# Patient Record
Sex: MALE | Race: WHITE | NOT HISPANIC OR LATINO | Employment: UNEMPLOYED | URBAN - METROPOLITAN AREA
[De-identification: names, ages, dates, MRNs, and addresses within clinical notes are randomized per-mention and may not be internally consistent; named-entity substitution may affect disease eponyms.]

---

## 2021-01-19 ENCOUNTER — HISTORICAL (OUTPATIENT)
Dept: ADMINISTRATIVE | Facility: HOSPITAL | Age: 21
End: 2021-01-19

## 2022-04-10 ENCOUNTER — HISTORICAL (OUTPATIENT)
Dept: ADMINISTRATIVE | Facility: HOSPITAL | Age: 22
End: 2022-04-10
Payer: MEDICAID

## 2022-04-27 VITALS
HEIGHT: 70 IN | WEIGHT: 192.88 LBS | SYSTOLIC BLOOD PRESSURE: 115 MMHG | DIASTOLIC BLOOD PRESSURE: 78 MMHG | BODY MASS INDEX: 27.61 KG/M2

## 2022-05-04 NOTE — HISTORICAL [facility] CERNER
This is a historical note converted from [place]. Formatting and pictures may have been removed.  Please reference [place] for original formatting and attached multimedia. Chief Complaint  left pinky  History of Present Illness  ?  20 Years?old ?ambidextrous ?Male?non-smoker?presents to?sports medicine clinic?for?initial evaluation?for left pinky pain per referral.  Patient is?6 weeks post injury ( DOI 12/10/20 ).  Patient states he was trying to catch a football and he feels his pinky was jammed and hyperextended. He felt immediate pain. He went to [facility] about 2 weeks after due to persistent pain, and had an x-ray of which he told there was no fracture. Currently, he continues to have occasional sharp pain of his pinky with activity, middle phalanx dorsal and palmar aspect.  NANDINI: As above  Previously seen in [facility].  Previous treatment:?none  previous injuries:?denies  Current pain level: 4/10 ( rated as?none)?without pain medication  associated symptoms:?no numbness and tingling;?no swelling;?no skin changes;?no weakness;?no decrease in ROM  Current activity level: Student, plays piano  Family history:?non contributory  ?  ?  Review of Systems  Constitutional: no fever, no chills, no weight loss  CV: no swelling, no edema  : no urinary retention, no urinary incontinence  GI: no fecal incontinence  Skin: no rash, no wound  Neuro: no numbness/tingling, no weakness, no saddle anesthesia  MSK: as above  Psych: no depression, no anxiety  Heme/Lymph: no easy bruising, no easy bleeding, no lymphadenopathy  Immuno: no allergic reaction, no recurrent infections  Physical Exam  Vitals & Measurements  HR:?68(Peripheral)? BP:?115/78?  HT:?178.00?cm? WT:?87.500?kg? BMI:?27.62?  General: well developed; well nourished; cooperative  PSYCH: alert and oriented with?appropriate mood and affect  SKIN: inspection and palpation of skin and soft tissue normal; no scars noted on upper/lower extremities  CV: vascular  integrity noted; +2 symmetrical pulses, no edema  NEURO: sensation intact by light touch;  LYMPH: no LAD noted  ?   MSK exam?Left?hand?  ?  Inspection:?no skin changes;?no swelling;?no atrophy;?no deformity noted.?Brusing over PIP  Palpation:?no mass noted;TTP at?PIP dorsal and?volar spect;?no crepitus;?no joint effusion?  ROM:?  MCP( 0-85) : 0-85  PIP (0-110) : 0-110  DIP( 0-65) : 0-65  Strenght: 4/5?  ?   Neurovascular:?  Sensation: two point discrimination?intact?  Motor:?  Radial nerve: IP joint extension against resistance?intact?  Median nerve:?  recurrent motor branch: Palmar abduction of thumb?intact?  anterior interosseous branch: Ok sign?intact?  Ulnar nerve: abduction of fingers against resistance?intact?  Vascular: pulses 2+, Allens test intact, capillary refill 2 seconds?  ?  ?   MSK exam?Right?hand?  ?  Inspection:?no skin changes;?no swelling;?no atrophy;?no deformity noted.?  Palpation:?no mass noted;no TTP;?no crepitus;?no joint effusion?  ROM:?  ROM:?  MCP( 0-85) : 0-85  PIP (0-110) : 0-110  DIP( 0-65) : 0-65  Strength: 5/5?  ?   Neurovascular:?  Sensation: two point discrimination?intact?  Motor:?  Radial nerve: IP joint extension against resistance?intact?  Median nerve:?  recurrent motor branch: Palmar abduction of thumb?intact?  anterior interosseous branch: Ok sign?intact?  Ulnar nerve: abduction of fingers against resistance?intact?  Vascular: pulses 2+, Allens test intact, capillary refill 2 seconds?  Assessment/Plan  1.?Fracture of proximal phalanx of little finger?S67.838E  ?Patient with fracture of volar?base of proximal phalanx of little finger. Patient is 6 weeks out from injury. He continues to have some tenderness over the dorsal and volar aspect of PIP. Tendons are intact, but he does have decrease strength compared to contralateral side. At this time will manage conservatively with aggressive ROM with OT.  ?  Imaging: Xray ordered and interpreted by me. Discussed with patient.  Awaiting official read.  Stabilization/Immobilization:?Finger splint?provided to?wear as?needed for?protection?  Activity:?Activity as tolerated  Therapy:?Formal OT?for ROM?prescribed  Medication:?OTC NSAIDs or Tylenol prn  RTC:?6 weeks, if feeling improved can cancel appointment.  Imaging:?none  Additional work-up:?none  ?  Orders:  XR Hand Left Minimum 3 Views, Routine, 01/19/21 9:24:00 CST, Pain, None, Ambulatory, Rad Type, Hand pain, left, Not Scheduled, 01/19/21 9:24:00 CST  Discussed with the fellow at time of visit? Patient chart reviewed. Patient seen and evaluated at time of visit.?HPI, PE, and Assessment and Plan reviewed. Treatment plan is reasonable and appropriate.? All questions were answered.??Compliance with treatment plan is appropriate.  ?Radiology images independently reviewed and agree with radiologist. ?Radiology images independently reviewed and agree with fellow.?-Aiden Luong, DO CAQ[facility]   Medications  No active medications  Diagnostic Results  Xray left hand 1/19: On my read, intraarticular fracture at base of proximal phalanx of fifth digit, volar plate. Awaiting official read.